# Patient Record
Sex: MALE | Race: WHITE | NOT HISPANIC OR LATINO | URBAN - METROPOLITAN AREA
[De-identification: names, ages, dates, MRNs, and addresses within clinical notes are randomized per-mention and may not be internally consistent; named-entity substitution may affect disease eponyms.]

---

## 2021-06-26 ENCOUNTER — EMERGENCY (EMERGENCY)
Facility: HOSPITAL | Age: 26
LOS: 1 days | Discharge: HOME | End: 2021-06-26
Admitting: EMERGENCY MEDICINE
Payer: COMMERCIAL

## 2021-06-26 VITALS
HEART RATE: 83 BPM | RESPIRATION RATE: 18 BRPM | TEMPERATURE: 98 F | OXYGEN SATURATION: 100 % | SYSTOLIC BLOOD PRESSURE: 125 MMHG | DIASTOLIC BLOOD PRESSURE: 86 MMHG

## 2021-06-26 VITALS
TEMPERATURE: 98 F | OXYGEN SATURATION: 97 % | DIASTOLIC BLOOD PRESSURE: 72 MMHG | HEART RATE: 69 BPM | SYSTOLIC BLOOD PRESSURE: 113 MMHG | RESPIRATION RATE: 18 BRPM

## 2021-06-26 DIAGNOSIS — R41.82 ALTERED MENTAL STATUS, UNSPECIFIED: ICD-10-CM

## 2021-06-26 DIAGNOSIS — F10.129 ALCOHOL ABUSE WITH INTOXICATION, UNSPECIFIED: ICD-10-CM

## 2021-06-26 DIAGNOSIS — R11.2 NAUSEA WITH VOMITING, UNSPECIFIED: ICD-10-CM
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
## 2021-06-26 PROCEDURE — 99284 EMERGENCY DEPT VISIT MOD MDM: CPT

## 2021-06-26 RX ORDER — ONDANSETRON 8 MG/1
4 TABLET, FILM COATED ORAL ONCE
Refills: 0 | Status: COMPLETED | OUTPATIENT
Start: 2021-06-26 | End: 2021-06-26

## 2021-06-26 RX ADMIN — ONDANSETRON 4 MILLIGRAM(S): 8 TABLET, FILM COATED ORAL at 03:59

## 2021-06-26 NOTE — ED ADULT NURSE NOTE - CHIEF COMPLAINT QUOTE
pt found on the street on [address] [place] [address], [facility] , presumed to be intoxicated , on assessment pt open eyes to name, admits to alcohol , denies pmhx, lives in [place] kitchen , NKDA, nil obvious injuries to person, pt came in not wearing clothes on top half

## 2021-06-26 NOTE — ED PROVIDER NOTE - PHYSICAL EXAMINATION
Gen - WDWN M, +AOB, lethargic but arousable by verbal stimuli   Skin - warm, dry, intact  HEENT - AT/NC, PERRL, mild conjunctival injection, pupils 3mm b/l, TM intact with no hemotympanium b/l, no facial contusion or periorbital ecchymosis, o/p clear, uvula midline, airway patent, neck supple with no step off or midline tenderness, FROM   CV - S1S2, R/R/R  Resp - respiration non-labored, CTAB, symmetric bs b/l, no r/r/w  GI - NABS, soft, ND, NT, no rebound or guarding, no CVAT b/l  MS - moving all extremities, no c/c/e   Neuro - Lethargic but arousable by verbal stimuli, garble speech, unable to ambulate

## 2021-06-26 NOTE — ED PROVIDER NOTE - CARE PLAN
Principal Discharge DX:	Altered mental status associated with intoxication  Secondary Diagnosis:	Nausea and vomiting

## 2021-06-26 NOTE — ED PROVIDER NOTE - OBJECTIVE STATEMENT
27 yo M with no known PMHx, BIBA for AMS and N/V.  Pt admits to heavy alcohol consumption tonight, was found sleeping by the sidewalk with vomitus over himself.  Noted one episode of NBNB emesis en route to the ED per EMS. No apparent trauma, bleeding, respiratory distress, pain, focal weakness, urinary/bowel incontinence or tremors noted. Pt is altered and unable to provide clinical information currently

## 2021-06-26 NOTE — ED ADULT TRIAGE NOTE - CHIEF COMPLAINT QUOTE
pt found on the street on [address] [place] [address], [facility] , presumed to be intoxicated , on assessment pt open eyes to name, admits to alcohol , denies pmhx, lives in [employer] [place] , NKDA, nil obvious injuries to person, pt came in not wearing clothes on top half

## 2021-06-26 NOTE — ED PROVIDER NOTE - PATIENT PORTAL LINK FT
You can access the FollowMyHealth Patient Portal offered by [facility] by registering at the following website: [email or web address]. By joining [employer]s FollowMyHealth portal, you will also be able to view your health information using other applications (apps) compatible with our system.